# Patient Record
Sex: MALE
[De-identification: names, ages, dates, MRNs, and addresses within clinical notes are randomized per-mention and may not be internally consistent; named-entity substitution may affect disease eponyms.]

---

## 2024-01-22 ENCOUNTER — RX ONLY (RX ONLY)
Age: 64
End: 2024-01-22

## 2024-01-22 ENCOUNTER — OFFICE (OUTPATIENT)
Dept: URBAN - METROPOLITAN AREA CLINIC 103 | Facility: CLINIC | Age: 64
Setting detail: OPHTHALMOLOGY
End: 2024-01-22
Payer: MEDICARE

## 2024-01-22 DIAGNOSIS — H40.2211: ICD-10-CM

## 2024-01-22 DIAGNOSIS — H40.2223: ICD-10-CM

## 2024-01-22 DIAGNOSIS — H04.123: ICD-10-CM

## 2024-01-22 DIAGNOSIS — H25.13: ICD-10-CM

## 2024-01-22 DIAGNOSIS — H35.033: ICD-10-CM

## 2024-01-22 PROCEDURE — 92020 GONIOSCOPY: CPT | Performed by: OPHTHALMOLOGY

## 2024-01-22 PROCEDURE — 76514 ECHO EXAM OF EYE THICKNESS: CPT | Performed by: OPHTHALMOLOGY

## 2024-01-22 PROCEDURE — 92004 COMPRE OPH EXAM NEW PT 1/>: CPT | Performed by: OPHTHALMOLOGY

## 2024-01-22 PROCEDURE — 92133 CPTRZD OPH DX IMG PST SGM ON: CPT | Performed by: OPHTHALMOLOGY

## 2024-01-22 PROCEDURE — 92083 EXTENDED VISUAL FIELD XM: CPT | Performed by: OPHTHALMOLOGY

## 2024-01-22 ASSESSMENT — REFRACTION_CURRENTRX
OD_CYLINDER: -0.50
OD_VPRISM_DIRECTION: BF
OD_OVR_VA: 20/
OS_ADD: +2.75
OS_SPHERE: +1.25
OS_OVR_VA: 20/
OD_ADD: +2.75
OS_CYLINDER: -0.75
OS_VPRISM_DIRECTION: BF
OS_AXIS: 120
OD_SPHERE: +1.25
OD_AXIS: 045

## 2024-01-22 ASSESSMENT — REFRACTION_AUTOREFRACTION
OD_CYLINDER: -1.00
OD_SPHERE: +1.25
OD_AXIS: 053
OS_CYLINDER: -0.75
OS_SPHERE: +1.25
OS_AXIS: 114

## 2024-01-22 ASSESSMENT — CONFRONTATIONAL VISUAL FIELD TEST (CVF)
OD_FINDINGS: FULL
OS_FINDINGS: FULL

## 2024-01-22 ASSESSMENT — SUPERFICIAL PUNCTATE KERATITIS (SPK)
OD_SPK: 1+
OS_SPK: 1+

## 2024-01-22 ASSESSMENT — SPHEQUIV_DERIVED
OS_SPHEQUIV: 0.875
OD_SPHEQUIV: 0.75

## 2024-03-11 ENCOUNTER — OFFICE (OUTPATIENT)
Dept: URBAN - METROPOLITAN AREA CLINIC 103 | Facility: CLINIC | Age: 64
Setting detail: OPHTHALMOLOGY
End: 2024-03-11
Payer: MEDICARE

## 2024-03-11 ENCOUNTER — RX ONLY (RX ONLY)
Age: 64
End: 2024-03-11

## 2024-03-11 DIAGNOSIS — H35.033: ICD-10-CM

## 2024-03-11 DIAGNOSIS — H40.2223: ICD-10-CM

## 2024-03-11 DIAGNOSIS — H25.13: ICD-10-CM

## 2024-03-11 DIAGNOSIS — H40.2211: ICD-10-CM

## 2024-03-11 DIAGNOSIS — H04.123: ICD-10-CM

## 2024-03-11 PROCEDURE — 92012 INTRM OPH EXAM EST PATIENT: CPT | Performed by: OPHTHALMOLOGY

## 2024-03-11 ASSESSMENT — REFRACTION_CURRENTRX
OD_ADD: +2.75
OD_AXIS: 045
OS_ADD: +2.75
OD_CYLINDER: -0.50
OS_SPHERE: +1.25
OD_VPRISM_DIRECTION: BF
OS_VPRISM_DIRECTION: BF
OS_AXIS: 120
OS_OVR_VA: 20/
OD_SPHERE: +1.25
OS_CYLINDER: -0.75
OD_OVR_VA: 20/

## 2024-04-26 ENCOUNTER — APPOINTMENT (OUTPATIENT)
Dept: ORTHOPEDIC SURGERY | Facility: CLINIC | Age: 64
End: 2024-04-26
Payer: MEDICARE

## 2024-04-26 VITALS — HEIGHT: 70 IN | BODY MASS INDEX: 38.65 KG/M2 | WEIGHT: 270 LBS

## 2024-04-26 DIAGNOSIS — I10 ESSENTIAL (PRIMARY) HYPERTENSION: ICD-10-CM

## 2024-04-26 DIAGNOSIS — H40.2223 CHRONIC ANGLE-CLOSURE GLAUCOMA, LEFT EYE, SEVERE STAGE: ICD-10-CM

## 2024-04-26 DIAGNOSIS — Z78.9 OTHER SPECIFIED HEALTH STATUS: ICD-10-CM

## 2024-04-26 PROBLEM — Z00.00 ENCOUNTER FOR PREVENTIVE HEALTH EXAMINATION: Status: ACTIVE | Noted: 2024-04-26

## 2024-04-26 PROCEDURE — 99204 OFFICE O/P NEW MOD 45 MIN: CPT

## 2024-04-26 PROCEDURE — 72040 X-RAY EXAM NECK SPINE 2-3 VW: CPT

## 2024-04-26 RX ORDER — MELOXICAM 15 MG/1
15 TABLET ORAL DAILY
Qty: 30 | Refills: 1 | Status: ACTIVE | COMMUNITY
Start: 2024-04-26 | End: 1900-01-01

## 2024-04-26 RX ORDER — METHOCARBAMOL 750 MG/1
750 TABLET, FILM COATED ORAL
Qty: 30 | Refills: 1 | Status: ACTIVE | COMMUNITY
Start: 2024-04-26 | End: 1900-01-01

## 2024-04-26 NOTE — DISCUSSION/SUMMARY
[de-identified] : 62 y/o M with chronic neck pain x 2 years.  Discussed medical mgmt (prescribed Meloxicam and Methocarbamol), and exercise-based rehabilitation. Patient was provided with a referral for cervical physical therapy to work on stretching, strengthening and range of motion.  MRI request / possible injections if symptoms do not improve from conservative treatment. FUV 6 WKS. Cumulative encounter duration exceeded 45 minutes.  Prior to appointment and during encounter with patient extensive medical records were reviewed including but not limited to, hospital records, outpatient records, imaging results, and lab data. During this appointment the patient was examined, diagnoses were discussed and explained in a face to face manner. In addition extensive time was spent reviewing aforementioned diagnostic studies. Counseling including abnormal image results, differential diagnoses, treatment options, risk and benefits, lifestyle changes, current condition, and current medications was performed. Patient's comments, questions, and concerns were addressed and patient verbalized understanding. Based on this patient's presentation at our office, which is an orthopedic spine surgeon's office, this patient inherently / intrinsically has a risk, however minute, of developing issues such as Cauda equina syndrome, bowel and bladder changes, or progression of motor or neurological deficits such as paralysis which may be permanent..sr FANNY KIM Acting as a Scribe for Dr. Kirill STATON, Fanny Kim, attest that this documentation has been prepared under the direction and in the presence of Provider Rasheed Roy MD.

## 2024-04-26 NOTE — PHYSICAL EXAM
[Flexion] : flexion [Extension] : extension [Rotation to left] : rotation to left [Rotation to right] : rotation to right [Biceps 2+] : biceps 2+ [Triceps 2+] : triceps 2+ [Brachioradialis 2+] : brachioradialis 2+ [Normal Coordination] : normal coordination [Normal DTR UE/LE] : normal DTR UE/LE  [Normal Sensation] : normal sensation [Normal Mood and Affect] : normal mood and affect [Oriented] : oriented [Able to Communicate] : able to communicate [Normal Skin] : normal skin [No Rash] : no rash [No Ulcers] : no ulcers [No Lesions] : no lesions [No obvious lymphadenopathy in areas examined] : no obvious lymphadenopathy in areas examined [Well Developed] : well developed [Peripheral vascular exam is grossly normal] : peripheral vascular exam is grossly normal [No Respiratory Distress] : no respiratory distress [de-identified] : Constitutional: - General Appearance: Unremarkable Body Habitus Well Developed Well Nourished Body Habitus No Deformities Well Groomed Ability To communicate: Normal Neurologic: Global sensation is intact to upper and lower extremities. See examination of Neck and/or Spine for exceptions. Orientation to Time, Place and Person is: Normal Mood And Affect is Normal Skin: - Head/Face, Right Upper/Lower Extremity, Left Upper/Lower Extremity: Normal See Examination of Neck and/or Spine for exceptions Cardiovascular: Peripheral Cardiovascular System is Normal Palpation of Lymph Nodes: Normal Palpation of lymph nodes in: Axilla, Cervical, Inguinal Abnormal Palpation of lymph nodes in: None  [] : negative Pearson reflex

## 2024-06-14 ENCOUNTER — APPOINTMENT (OUTPATIENT)
Dept: ORTHOPEDIC SURGERY | Facility: CLINIC | Age: 64
End: 2024-06-14
Payer: MEDICARE

## 2024-06-14 VITALS — HEIGHT: 70 IN | WEIGHT: 270 LBS | BODY MASS INDEX: 38.65 KG/M2

## 2024-06-14 DIAGNOSIS — M47.812 SPONDYLOSIS W/OUT MYELOPATHY OR RADICULOPATHY, CERVICAL REGION: ICD-10-CM

## 2024-06-14 PROCEDURE — 99214 OFFICE O/P EST MOD 30 MIN: CPT

## 2024-06-16 NOTE — PHYSICAL EXAM
[Normal Coordination] : normal coordination [Normal DTR UE/LE] : normal DTR UE/LE  [Normal Sensation] : normal sensation [Normal Mood and Affect] : normal mood and affect [Oriented] : oriented [Able to Communicate] : able to communicate [Normal Skin] : normal skin [No Rash] : no rash [No Ulcers] : no ulcers [No Lesions] : no lesions [No obvious lymphadenopathy in areas examined] : no obvious lymphadenopathy in areas examined [Well Developed] : well developed [Peripheral vascular exam is grossly normal] : peripheral vascular exam is grossly normal [No Respiratory Distress] : no respiratory distress [Flexion] : flexion [Extension] : extension [Rotation to left] : rotation to left [Rotation to right] : rotation to right [Biceps 2+] : biceps 2+ [Triceps 2+] : triceps 2+ [Brachioradialis 2+] : brachioradialis 2+ [de-identified] : Constitutional: - General Appearance: Unremarkable Body Habitus Well Developed Well Nourished Body Habitus No Deformities Well Groomed Ability To communicate: Normal Neurologic: Global sensation is intact to upper and lower extremities. See examination of Neck and/or Spine for exceptions. Orientation to Time, Place and Person is: Normal Mood And Affect is Normal Skin: - Head/Face, Right Upper/Lower Extremity, Left Upper/Lower Extremity: Normal See Examination of Neck and/or Spine for exceptions Cardiovascular: Peripheral Cardiovascular System is Normal Palpation of Lymph Nodes: Normal Palpation of lymph nodes in: Axilla, Cervical, Inguinal Abnormal Palpation of lymph nodes in: None  [] : negative Pearson reflex

## 2024-06-16 NOTE — HISTORY OF PRESENT ILLNESS
[2] : 2 [0] : 0 [Retired] : Work status: retired [de-identified] : 06/14/2024:  62 y/o M presenting for follow up of left sided neck pain. He reports that he is doing better since the last office visit, attending physical therapy 2x a week along with chair yoga almost every day. He is currently taking methocarbamol and meloxicam regularly for pain.  04/26/2024 - 62 y/o F presenting for initial evaluation with chief complaint of left sided neck pain. Symptoms have been primarily right sided neck pain for 2 years but now it is more localized to left side. Pain limits ROM and severity ranges depending on the day. Sought chiropractic care for 15 visits with zero improvement. Also has completed PT but had to d/c due to financial issues. No pain radiating into the arms, numbness/tingling, or upper extremity weakness. Unable to take nsaid due to blood thinner. He has controlled high BP with medication. No abnormalities are reported to general medical health.  [de-identified] : p/t

## 2024-06-16 NOTE — DISCUSSION/SUMMARY
[de-identified] : 62 y/o M with chronic neck pain x 2 years.  Discussed medical mgmt (prescribed Meloxicam and Methocarbamol), and exercise-based rehabilitation. Patient was provided with a renewal for cervical physical therapy to work on stretching, strengthening and range of motion.  MRI request / possible injections if symptoms do not improve from conservative treatment. FUV 6 WKS. Cumulative encounter duration exceeded 30 minutes.  Prior to appointment and during encounter with patient extensive medical records were reviewed including but not limited to, hospital records, outpatient records, imaging results, and lab data. During this appointment the patient was examined, diagnoses were discussed and explained in a face to face manner. In addition extensive time was spent reviewing aforementioned diagnostic studies. Counseling including abnormal image results, differential diagnoses, treatment options, risk and benefits, lifestyle changes, current condition, and current medications was performed. Patient's comments, questions, and concerns were addressed and patient verbalized understanding. Based on this patient's presentation at our office, which is an orthopedic spine surgeon's office, this patient inherently / intrinsically has a risk, however minute, of developing issues such as Cauda equina syndrome, bowel and bladder changes, or progression of motor or neurological deficits such as paralysis which may be permanent..sr SRI GUNDERSON acting as a Scribe for Dr. Kirill STATON, Sri Gunderson, attest that this documentation has been prepared under the direction and in the presence of Provider Rasheed Roy MD.

## 2024-07-26 ENCOUNTER — APPOINTMENT (OUTPATIENT)
Dept: ORTHOPEDIC SURGERY | Facility: CLINIC | Age: 64
End: 2024-07-26

## 2024-08-01 ENCOUNTER — APPOINTMENT (OUTPATIENT)
Dept: MRI IMAGING | Facility: CLINIC | Age: 64
End: 2024-08-01
Payer: MEDICARE

## 2024-08-01 ENCOUNTER — OUTPATIENT (OUTPATIENT)
Dept: OUTPATIENT SERVICES | Facility: HOSPITAL | Age: 64
LOS: 1 days | End: 2024-08-01
Payer: MEDICARE

## 2024-08-01 DIAGNOSIS — G24.3 SPASMODIC TORTICOLLIS: ICD-10-CM

## 2024-08-01 PROCEDURE — 72141 MRI NECK SPINE W/O DYE: CPT | Mod: 26

## 2024-08-01 PROCEDURE — 70551 MRI BRAIN STEM W/O DYE: CPT | Mod: 26

## 2024-08-01 PROCEDURE — 72141 MRI NECK SPINE W/O DYE: CPT

## 2024-08-01 PROCEDURE — 70551 MRI BRAIN STEM W/O DYE: CPT

## 2024-09-30 ENCOUNTER — OFFICE (OUTPATIENT)
Dept: URBAN - METROPOLITAN AREA CLINIC 103 | Facility: CLINIC | Age: 64
Setting detail: OPHTHALMOLOGY
End: 2024-09-30
Payer: MEDICARE

## 2024-09-30 DIAGNOSIS — H25.13: ICD-10-CM

## 2024-09-30 DIAGNOSIS — H40.2223: ICD-10-CM

## 2024-09-30 DIAGNOSIS — H35.033: ICD-10-CM

## 2024-09-30 DIAGNOSIS — H40.2211: ICD-10-CM

## 2024-09-30 DIAGNOSIS — H04.123: ICD-10-CM

## 2024-09-30 PROCEDURE — 92250 FUNDUS PHOTOGRAPHY W/I&R: CPT | Performed by: OPHTHALMOLOGY

## 2024-09-30 PROCEDURE — 92014 COMPRE OPH EXAM EST PT 1/>: CPT | Performed by: OPHTHALMOLOGY

## 2024-09-30 ASSESSMENT — CONFRONTATIONAL VISUAL FIELD TEST (CVF)
OD_FINDINGS: FULL
OS_FINDINGS: FULL

## 2024-10-08 ENCOUNTER — OFFICE (OUTPATIENT)
Dept: URBAN - METROPOLITAN AREA CLINIC 103 | Facility: CLINIC | Age: 64
Setting detail: OPHTHALMOLOGY
End: 2024-10-08
Payer: MEDICARE

## 2024-10-08 DIAGNOSIS — H04.123: ICD-10-CM

## 2024-10-08 PROBLEM — H53.002 AMBLYOPIA; LEFT EYE: Status: ACTIVE | Noted: 2024-10-08

## 2024-10-08 PROBLEM — H52.4 PRESBYOPIA: Status: ACTIVE | Noted: 2024-10-08

## 2024-10-08 PROCEDURE — 92012 INTRM OPH EXAM EST PATIENT: CPT | Performed by: OPTOMETRIST

## 2024-10-08 ASSESSMENT — REFRACTION_CURRENTRX
OD_SPHERE: +1.25
OS_VPRISM_DIRECTION: BF
OD_SPHERE: +1.25
OS_ADD: +2.75
OD_VPRISM_DIRECTION: BF
OD_AXIS: 050
OD_ADD: +2.75
OD_OVR_VA: 20/
OD_VPRISM_DIRECTION: BF
OS_CYLINDER: -1.00
OS_SPHERE: +1.50
OS_OVR_VA: 20/
OD_CYLINDER: -0.50
OD_OVR_VA: 20/
OS_SPHERE: +1.50
OD_AXIS: 045
OD_CYLINDER: -0.50
OS_AXIS: 114
OS_ADD: +2.75
OS_VPRISM_DIRECTION: BF
OD_ADD: +2.75
OS_CYLINDER: -1.00
OS_OVR_VA: 20/
OS_AXIS: 110

## 2024-10-08 ASSESSMENT — REFRACTION_MANIFEST
OD_CYLINDER: -0.50
OD_ADD: +2.50
OS_SPHERE: +1.50
OS_ADD: +2.50
OS_AXIS: 114
OD_VA1: 20/20
OD_AXIS: 045
OD_SPHERE: +1.25
OS_VA1: 20/30+2
OS_CYLINDER: -1.00

## 2024-10-08 ASSESSMENT — CONFRONTATIONAL VISUAL FIELD TEST (CVF)
OS_FINDINGS: FULL
OD_FINDINGS: FULL

## 2024-10-08 ASSESSMENT — KERATOMETRY
OD_K1POWER_DIOPTERS: UTP
OS_K1POWER_DIOPTERS: UTP

## 2024-10-08 ASSESSMENT — SUPERFICIAL PUNCTATE KERATITIS (SPK)
OD_SPK: 1+
OS_SPK: 1+

## 2024-10-08 ASSESSMENT — REFRACTION_AUTOREFRACTION
OD_CYLINDER: -0.50
OD_AXIS: 045
OS_AXIS: 114
OS_SPHERE: +1.50
OD_SPHERE: +1.25
OS_CYLINDER: -1.00

## 2024-10-08 ASSESSMENT — VISUAL ACUITY
OS_BCVA: 20/30
OD_BCVA: 20/50-2

## 2025-03-17 ENCOUNTER — OFFICE (OUTPATIENT)
Dept: URBAN - METROPOLITAN AREA CLINIC 112 | Facility: CLINIC | Age: 65
Setting detail: OPHTHALMOLOGY
End: 2025-03-17
Payer: MEDICARE

## 2025-03-17 DIAGNOSIS — H25.13: ICD-10-CM

## 2025-03-17 DIAGNOSIS — H40.2211: ICD-10-CM

## 2025-03-17 DIAGNOSIS — H35.033: ICD-10-CM

## 2025-03-17 DIAGNOSIS — H40.2223: ICD-10-CM

## 2025-03-17 PROCEDURE — 92083 EXTENDED VISUAL FIELD XM: CPT | Performed by: OPHTHALMOLOGY

## 2025-03-17 PROCEDURE — 92133 CPTRZD OPH DX IMG PST SGM ON: CPT | Performed by: OPHTHALMOLOGY

## 2025-03-17 PROCEDURE — 92014 COMPRE OPH EXAM EST PT 1/>: CPT | Performed by: OPHTHALMOLOGY

## 2025-03-17 PROCEDURE — 92020 GONIOSCOPY: CPT | Performed by: OPHTHALMOLOGY

## 2025-03-17 ASSESSMENT — REFRACTION_MANIFEST
OD_ADD: +2.50
OS_SPHERE: +1.50
OD_CYLINDER: -0.50
OD_SPHERE: +1.25
OS_ADD: +2.50
OD_VA1: 20/20
OD_AXIS: 045
OS_VA1: 20/30+2
OS_AXIS: 114
OS_CYLINDER: -1.00

## 2025-03-17 ASSESSMENT — REFRACTION_CURRENTRX
OD_ADD: +2.75
OD_OVR_VA: 20/
OS_CYLINDER: -1.00
OD_CYLINDER: -0.50
OS_SPHERE: +1.50
OD_CYLINDER: -0.50
OD_AXIS: 050
OS_SPHERE: +1.50
OS_VPRISM_DIRECTION: BF
OS_OVR_VA: 20/
OD_ADD: +2.75
OD_VPRISM_DIRECTION: BF
OD_AXIS: 045
OS_CYLINDER: -1.00
OD_OVR_VA: 20/
OS_AXIS: 114
OD_SPHERE: +1.25
OD_VPRISM_DIRECTION: BF
OS_VPRISM_DIRECTION: BF
OS_AXIS: 110
OS_ADD: +2.75
OS_OVR_VA: 20/
OS_ADD: +2.75
OD_SPHERE: +1.25

## 2025-03-17 ASSESSMENT — SUPERFICIAL PUNCTATE KERATITIS (SPK)
OS_SPK: 1+
OD_SPK: 1+

## 2025-03-17 ASSESSMENT — REFRACTION_AUTOREFRACTION
OS_AXIS: 147
OS_SPHERE: +1.50
OD_AXIS: 020
OS_CYLINDER: -1.50
OD_SPHERE: +1.00
OD_CYLINDER: -1.50

## 2025-03-17 ASSESSMENT — KERATOMETRY
OS_AXISANGLE_DEGREES: 066
OD_K2POWER_DIOPTERS: 42.50
OD_AXISANGLE_DEGREES: 113
OS_K2POWER_DIOPTERS: 43.25
OS_K1POWER_DIOPTERS: 41.75
OD_K1POWER_DIOPTERS: 41.50

## 2025-03-17 ASSESSMENT — CONFRONTATIONAL VISUAL FIELD TEST (CVF)
OS_FINDINGS: FULL
OD_FINDINGS: FULL

## 2025-03-17 ASSESSMENT — VISUAL ACUITY
OS_BCVA: 20/20-1
OD_BCVA: 20/30-2

## 2025-03-17 ASSESSMENT — TONOMETRY
OS_IOP_MMHG: 10
OD_IOP_MMHG: 13

## 2025-04-08 ENCOUNTER — OFFICE (OUTPATIENT)
Dept: URBAN - METROPOLITAN AREA CLINIC 112 | Facility: CLINIC | Age: 65
Setting detail: OPHTHALMOLOGY
End: 2025-04-08
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
## 2025-04-08 PROCEDURE — NO SHOW FE NO SHOW FEE: Performed by: OPHTHALMOLOGY

## 2025-04-22 ENCOUNTER — OFFICE (OUTPATIENT)
Dept: URBAN - METROPOLITAN AREA CLINIC 112 | Facility: CLINIC | Age: 65
Setting detail: OPHTHALMOLOGY
End: 2025-04-22
Payer: MEDICARE

## 2025-04-22 DIAGNOSIS — G24.5: ICD-10-CM

## 2025-04-22 PROCEDURE — 92012 INTRM OPH EXAM EST PATIENT: CPT | Performed by: OPHTHALMOLOGY

## 2025-04-22 PROCEDURE — 92285 EXTERNAL OCULAR PHOTOGRAPHY: CPT | Performed by: OPHTHALMOLOGY

## 2025-04-22 ASSESSMENT — REFRACTION_CURRENTRX
OD_VPRISM_DIRECTION: BF
OD_CYLINDER: -0.50
OS_SPHERE: +1.50
OD_SPHERE: +1.25
OD_OVR_VA: 20/
OD_CYLINDER: -0.50
OS_ADD: +2.75
OD_AXIS: 050
OS_CYLINDER: -1.00
OS_SPHERE: +1.50
OS_CYLINDER: -1.00
OS_ADD: +2.75
OD_OVR_VA: 20/
OS_AXIS: 114
OS_OVR_VA: 20/
OS_AXIS: 110
OD_AXIS: 045
OD_ADD: +2.75
OD_VPRISM_DIRECTION: BF
OS_OVR_VA: 20/
OD_ADD: +2.75
OS_VPRISM_DIRECTION: BF
OD_SPHERE: +1.25
OS_VPRISM_DIRECTION: BF

## 2025-04-22 ASSESSMENT — VISUAL ACUITY
OS_BCVA: 20/20-1
OD_BCVA: 20/30-2

## 2025-04-22 ASSESSMENT — REFRACTION_MANIFEST
OS_CYLINDER: -1.00
OD_ADD: +2.50
OS_ADD: +2.50
OS_AXIS: 114
OD_VA1: 20/20
OS_SPHERE: +1.50
OD_CYLINDER: -0.50
OS_VA1: 20/30+2
OD_AXIS: 045
OD_SPHERE: +1.25

## 2025-04-22 ASSESSMENT — REFRACTION_AUTOREFRACTION
OS_AXIS: 147
OS_SPHERE: +1.50
OS_CYLINDER: -1.50
OD_SPHERE: +1.00
OD_CYLINDER: -1.50
OD_AXIS: 020

## 2025-04-22 ASSESSMENT — KERATOMETRY
OS_K2POWER_DIOPTERS: 43.25
OD_K2POWER_DIOPTERS: 42.50
OS_AXISANGLE_DEGREES: 066
OD_K1POWER_DIOPTERS: 41.50
OD_AXISANGLE_DEGREES: 113
OS_K1POWER_DIOPTERS: 41.75

## 2025-04-22 ASSESSMENT — SUPERFICIAL PUNCTATE KERATITIS (SPK)
OD_SPK: 1+
OS_SPK: 1+

## 2025-07-07 ENCOUNTER — OFFICE (OUTPATIENT)
Dept: URBAN - METROPOLITAN AREA CLINIC 112 | Facility: CLINIC | Age: 65
Setting detail: OPHTHALMOLOGY
End: 2025-07-07
Payer: MEDICARE

## 2025-07-07 DIAGNOSIS — H25.13: ICD-10-CM

## 2025-07-07 DIAGNOSIS — H40.2223: ICD-10-CM

## 2025-07-07 DIAGNOSIS — H35.033: ICD-10-CM

## 2025-07-07 DIAGNOSIS — H40.2211: ICD-10-CM

## 2025-07-07 PROCEDURE — 92012 INTRM OPH EXAM EST PATIENT: CPT | Performed by: OPHTHALMOLOGY

## 2025-07-07 PROCEDURE — 92250 FUNDUS PHOTOGRAPHY W/I&R: CPT | Performed by: OPHTHALMOLOGY

## 2025-07-07 ASSESSMENT — REFRACTION_CURRENTRX
OS_SPHERE: +1.50
OD_CYLINDER: -0.50
OS_CYLINDER: -1.00
OS_OVR_VA: 20/
OD_SPHERE: +1.25
OD_VPRISM_DIRECTION: BF
OS_OVR_VA: 20/
OS_SPHERE: +1.50
OS_CYLINDER: -1.00
OS_AXIS: 110
OS_CYLINDER: -1.00
OS_ADD: +2.75
OD_AXIS: 050
OD_OVR_VA: 20/
OS_ADD: +2.75
OS_OVR_VA: 20/
OS_ADD: +2.75
OD_VPRISM_DIRECTION: BF
OD_ADD: +2.75
OD_AXIS: 045
OS_VPRISM_DIRECTION: BF
OD_AXIS: 050
OD_ADD: +2.75
OS_AXIS: 110
OD_ADD: +2.75
OS_VPRISM_DIRECTION: BF
OS_AXIS: 114
OD_CYLINDER: -0.50
OD_CYLINDER: -0.50
OS_VPRISM_DIRECTION: BF
OD_SPHERE: +1.25
OD_VPRISM_DIRECTION: BF
OD_OVR_VA: 20/
OS_SPHERE: +1.50
OD_SPHERE: +1.25
OD_OVR_VA: 20/

## 2025-07-07 ASSESSMENT — REFRACTION_MANIFEST
OD_VA1: 20/20
OS_CYLINDER: -1.00
OS_ADD: +2.50
OS_VA1: 20/30+2
OS_SPHERE: +1.50
OD_ADD: +2.50
OD_SPHERE: +1.25
OD_AXIS: 045
OS_AXIS: 114
OD_CYLINDER: -0.50

## 2025-07-07 ASSESSMENT — KERATOMETRY
OD_K1POWER_DIOPTERS: 41.50
OS_K1POWER_DIOPTERS: 41.75
OD_AXISANGLE_DEGREES: 115
OD_K2POWER_DIOPTERS: 42.50
OS_K2POWER_DIOPTERS: 43.25
OS_AXISANGLE_DEGREES: 065

## 2025-07-07 ASSESSMENT — SUPERFICIAL PUNCTATE KERATITIS (SPK)
OD_SPK: 1+
OS_SPK: 1+

## 2025-07-07 ASSESSMENT — REFRACTION_AUTOREFRACTION
OD_CYLINDER: -2.50
OD_AXIS: 060
OS_SPHERE: +1.75
OS_CYLINDER: -1.50
OS_AXIS: 160
OD_SPHERE: +0.50

## 2025-07-07 ASSESSMENT — TONOMETRY
OD_IOP_MMHG: 13
OS_IOP_MMHG: 13

## 2025-07-07 ASSESSMENT — VISUAL ACUITY
OS_BCVA: 20/30
OD_BCVA: 20/30

## 2025-07-07 ASSESSMENT — CONFRONTATIONAL VISUAL FIELD TEST (CVF)
OS_FINDINGS: FULL
OD_FINDINGS: FULL

## 2025-08-06 ENCOUNTER — OUTPATIENT (OUTPATIENT)
Dept: OUTPATIENT SERVICES | Facility: HOSPITAL | Age: 65
LOS: 1 days | End: 2025-08-06
Payer: MEDICARE

## 2025-08-06 ENCOUNTER — APPOINTMENT (OUTPATIENT)
Dept: CT IMAGING | Facility: CLINIC | Age: 65
End: 2025-08-06
Payer: MEDICARE

## 2025-08-06 DIAGNOSIS — Z00.8 ENCOUNTER FOR OTHER GENERAL EXAMINATION: ICD-10-CM

## 2025-08-06 DIAGNOSIS — G70.00 MYASTHENIA GRAVIS WITHOUT (ACUTE) EXACERBATION: ICD-10-CM

## 2025-08-06 PROCEDURE — 71250 CT THORAX DX C-: CPT | Mod: 26

## 2025-08-06 PROCEDURE — 71250 CT THORAX DX C-: CPT
